# Patient Record
Sex: FEMALE | Race: WHITE | NOT HISPANIC OR LATINO | URBAN - METROPOLITAN AREA
[De-identification: names, ages, dates, MRNs, and addresses within clinical notes are randomized per-mention and may not be internally consistent; named-entity substitution may affect disease eponyms.]

---

## 2024-11-25 ENCOUNTER — APPOINTMENT (RX ONLY)
Dept: URBAN - METROPOLITAN AREA CLINIC 41 | Facility: CLINIC | Age: 39
Setting detail: DERMATOLOGY
End: 2024-11-25

## 2024-11-25 DIAGNOSIS — Z41.9 ENCOUNTER FOR PROCEDURE FOR PURPOSES OTHER THAN REMEDYING HEALTH STATE, UNSPECIFIED: ICD-10-CM

## 2024-11-25 PROCEDURE — ? FILLERS

## 2024-11-25 PROCEDURE — ? COSMETIC CONSULTATION: FILLERS

## 2024-11-25 PROCEDURE — ? ADDITIONAL NOTES

## 2024-11-25 PROCEDURE — ? OTHER (COSMETIC)

## 2024-11-25 PROCEDURE — ? COSMETIC CONSULTATION: BOTULINUM TOXIN

## 2024-11-25 ASSESSMENT — LOCATION DETAILED DESCRIPTION DERM
LOCATION DETAILED: LEFT MEDIAL MALAR CHEEK
LOCATION DETAILED: LEFT CENTRAL MALAR CHEEK
LOCATION DETAILED: SUPERIOR MID FOREHEAD
LOCATION DETAILED: RIGHT CENTRAL MALAR CHEEK
LOCATION DETAILED: RIGHT LOWER CUTANEOUS LIP
LOCATION DETAILED: LEFT CENTRAL BUCCAL CHEEK
LOCATION DETAILED: RIGHT LATERAL MALAR CHEEK
LOCATION DETAILED: RIGHT MEDIAL BUCCAL CHEEK
LOCATION DETAILED: LEFT LOWER CUTANEOUS LIP
LOCATION DETAILED: GLABELLA
LOCATION DETAILED: LEFT ORAL COMMISSURE

## 2024-11-25 ASSESSMENT — LOCATION SIMPLE DESCRIPTION DERM
LOCATION SIMPLE: GLABELLA
LOCATION SIMPLE: RIGHT CHEEK
LOCATION SIMPLE: RIGHT LIP
LOCATION SIMPLE: SUPERIOR FOREHEAD
LOCATION SIMPLE: LEFT CHEEK
LOCATION SIMPLE: LEFT LIP

## 2024-11-25 ASSESSMENT — LOCATION ZONE DERM
LOCATION ZONE: FACE
LOCATION ZONE: LIP

## 2024-11-25 NOTE — PROCEDURE: ADDITIONAL NOTES
Detail Level: Detailed
Additional Notes: Note bunny lines. 3 or 4 areas. Treated with Botox. It relaxes the muscle, lasts 3-4 months. Results come in 7-10 days, teaches muscles not to make facial expression. When we look up it loooks good, when we look down it looks horrible. They bc of the laxity in the skin. Put  cheeks to help with nasolabial folds. Fillers in cheeks, results for fillers is instant. Will need to see if need additional filler other places, pt most concerned about corners around mouth, which is bc the laxity in the skin. We can start at corners of mouth, but cheeks also help with eyes. I discuss juvederm, that last for different periods of time. All are hyaluronic acid, particles are different sizes. 2 year one is only for cheeks. 750, 850, 1000 per syringe for 1 year to 2 years time. Pt should expect cheeks to be a little higher. Normally 2-4 syringes in cheeks, I would just like to do one total, half in each cheek bc pt has high cheekbones, volume there. If lines don’t go away, then illl do one more, a 1 year juvederm. Pt not interested in Botox yet. Better to do now while young. Pt asks about creams and gels, I advise against tape, but moisturizer is good. Tape is a gimmick. Pt just wants to do fillers today. I advise arnica for bruising, sleeping on back tonight, and no exercise today or tomorrow.
Render Risk Assessment In Note?: no

## 2024-11-25 NOTE — PROCEDURE: FILLERS
Dorsal Hands Filler Volume In Cc: 0
Siomara Amaro Syringe Price (Per 1.0 Cc- Numbers Only No Special Characters Or $): 0.00
Lot #: [id number]
Marionette Lines Filler Volume In Cc: 1
Detail Level: Detailed
Expiration Date (Month Year): 12/18/2025
Use Map Statement For Sites (Optional): No
Map Statment: See Attach Map for Complete Details
Transporeon Ultra Plus Xc Syringe Price (Per 1.0 Cc- Numbers Only No Special Characters Or $): 597
Consent: Written consent obtained. Risks include but not limited to bruising, beading, irregular texture, ulceration, infection, allergic reaction, scar formation, incomplete augmentation, temporary nature, procedural pain.
Lot #: [id number]
Juvederm Vollure Xc Syringe Price (Per 1.0 Cc- Numbers Only No Special Characters Or $): 356
Pre-Treatment: Skin was cleaned with alcohol prior to injections.
Anesthesia Type: 1% lidocaine with epinephrine
Riannavederm Voluma Xc Syringe Price (Per 1.0 Cc- Numbers Only No Special Characters Or $): 1000
Expiration Date (Month Year): 04/13/2025
Post-Care Instructions: Patient instructed to apply ice to reduce swelling.
Anesthesia Volume In Cc: 0.5
Additional Anesthesia Volume In Cc: 6
Pricing Information: This plan will add up the cumulative amount of filler you document and will bill based on the unit price noted below. For example if you inject 0.9 ccs of Restylane it will bill for one unit. If you inject 1.3 ccs it will bill for two units.
Additional Area 1 Location: chin
Filler: Juvederm Vollure XC
Lot #: [id number]
Expiration Date (Month Year): 08/31/2023
Filler: Juvederm Voluma XC